# Patient Record
(demographics unavailable — no encounter records)

---

## 2025-02-17 NOTE — PROCEDURE
[FreeTextEntry1] : Pulmonary function test 2/17/2025 moderate obstructive disease without a bronchodilator effect.

## 2025-02-17 NOTE — HISTORY OF PRESENT ILLNESS
[Never] : never [TextBox_4] : 70 female hx mild asthma and ch cough  today feels good but ++cough and allergic  to airy products  seen by doctor  in [place]  and [place] [facility] and both agree with dairy products  and all sx resolved   use minimal dairy products yesterday whip cream and sl cough no sob  no wheeze  full activity no nite awakening remains on breo 100 1 qd

## 2025-02-17 NOTE — REASON FOR VISIT
[Follow-Up] : a follow-up visit [Asthma] : asthma [TextBox_44] : rheumatoid arthritis  [TextEntry] : Patient states her asthma has been under control. She has not needed to use her rescue inhaler.  Patient does need refills of the albuterol.

## 2025-02-17 NOTE — DISCUSSION/SUMMARY
[FreeTextEntry1] : Ms. tello has moderate obstructive airways disease.  She is responding well to Breo 100 mg 1 spray daily and we will continue same.  I will order her albuterol 2 sprays 4 times a day on an as-needed basis.  The patient's cough has resolved when she avoids dairy.  No nasal sprays are indicated at this time.